# Patient Record
Sex: MALE | Race: WHITE | NOT HISPANIC OR LATINO
[De-identification: names, ages, dates, MRNs, and addresses within clinical notes are randomized per-mention and may not be internally consistent; named-entity substitution may affect disease eponyms.]

---

## 2020-04-26 ENCOUNTER — MESSAGE (OUTPATIENT)
Age: 35
End: 2020-04-26

## 2021-04-13 PROBLEM — Z00.00 ENCOUNTER FOR PREVENTIVE HEALTH EXAMINATION: Status: ACTIVE | Noted: 2021-04-13

## 2021-04-30 ENCOUNTER — APPOINTMENT (OUTPATIENT)
Dept: UROLOGY | Facility: CLINIC | Age: 36
End: 2021-04-30
Payer: COMMERCIAL

## 2021-04-30 VITALS
SYSTOLIC BLOOD PRESSURE: 123 MMHG | HEIGHT: 68 IN | BODY MASS INDEX: 28.79 KG/M2 | WEIGHT: 190 LBS | DIASTOLIC BLOOD PRESSURE: 74 MMHG | TEMPERATURE: 97.4 F | HEART RATE: 57 BPM

## 2021-04-30 PROCEDURE — 99072 ADDL SUPL MATRL&STAF TM PHE: CPT

## 2021-04-30 PROCEDURE — 99204 OFFICE O/P NEW MOD 45 MIN: CPT

## 2021-04-30 NOTE — PHYSICAL EXAM
[FreeTextEntry1] : well healed varicocelectomy incisions. right hydrocele 30cc. 8cc bilateral testes.

## 2021-04-30 NOTE — ASSESSMENT
[FreeTextEntry1] : hypogonadism\par nonobsturctive azoospermia\par We discussed the data surrounding the use of fresh sperm at the time of egg retrieval versus frozen sperm in patients with nonobstructive azoospermia with a successful TESE. I reviewed the results of the small studies and a recent meta-analysis suggesting equivalence in terms of take home baby rate in couples using fresh or frozen sperm. Given the possibility of TESE failure, role for donor sperm, and logistical challenges in schedule fresh sperm retrieval on date of egg retrieval, I recommend up front frozen TESE.\par I had a long conversation with him and his wife regarding the role of surgical sperm retrieval. They understand that some patients harbor pockets of spermatogenesis within the testis that can be retrieved surgically and used with IVF. In general, there is an approximately 45-60% likelihood of successful retrieval. Retrieval options, including a complete microdissection of the testis and more limited testicular biopsies were also discussed. They understand that the microTESE may have the best likelihood of identifying spermatogenesis. Risks of surgery, including hematoma, pain, long term hypogonadism requiring testosterone replacement therapy, were also discussed.\par would wait 6 months after varicocelectomy prior to proceeding\par continue clomid\par will call us if he decides to proceed here

## 2021-04-30 NOTE — HISTORY OF PRESENT ILLNESS
[FreeTextEntry1] : 36M  Evelyn Garcia (35F) comes in for evaluation\par diagnosis [facility] 2 years ago. underwent bilateral varicocelectomy 3/2021. Here for second opinion. considering microTESE July/August with PNS\par TT 400s\par FSH 20s\par E2 normal\par karyotype normal\par no Y micro\par started on empiric clomid 25 qod. small testis volume per report on sonogram. \par extended sperm search with sherie no sperm noted\par SA at [place] [facility] - no sperm noted.